# Patient Record
Sex: FEMALE | URBAN - METROPOLITAN AREA
[De-identification: names, ages, dates, MRNs, and addresses within clinical notes are randomized per-mention and may not be internally consistent; named-entity substitution may affect disease eponyms.]

---

## 2021-06-02 ENCOUNTER — APPOINTMENT (RX ONLY)
Dept: URBAN - METROPOLITAN AREA CLINIC 373 | Facility: CLINIC | Age: 86
Setting detail: DERMATOLOGY
End: 2021-06-02

## 2021-06-02 PROBLEM — C44.212 BASAL CELL CARCINOMA OF SKIN OF RIGHT EAR AND EXTERNAL AURICULAR CANAL: Status: ACTIVE | Noted: 2021-06-02

## 2021-06-02 PROBLEM — C44.219 BASAL CELL CARCINOMA OF SKIN OF LEFT EAR AND EXTERNAL AURICULAR CANAL: Status: ACTIVE | Noted: 2021-06-02

## 2021-06-02 PROBLEM — C44.319 BASAL CELL CARCINOMA OF SKIN OF OTHER PARTS OF FACE: Status: ACTIVE | Noted: 2021-06-02

## 2021-06-02 PROCEDURE — 99213 OFFICE O/P EST LOW 20 MIN: CPT

## 2021-06-02 PROCEDURE — ? COUNSELING

## 2021-06-02 PROCEDURE — ? ADDITIONAL NOTES

## 2021-06-02 NOTE — PROCEDURE: ADDITIONAL NOTES
Render Risk Assessment In Note?: no
Additional Notes: Stephania Ely is a 89 y.o. 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 She presents to the office today, with a health aid from her nursing home. The health aid conveys that Stephania has a new power or , who wanted her skin evaluated.  Stephania is upset and agitated at her appointment and refuses to have her care aid in the examination room with her.  \\n\\nMdeborah has dementia and is unable to consent for herself for procedures. At her office visit on 7-3-2019 a basal cell was biopsied from the left of midline forehead. Patient was fearful during the biopsy process and confused about where she was and what we were doing.  \\n \\nI spoke with Stephania and her care aid about the need for her power of  to be present at her office visits and futures Mohs appointments as Stephania is unable to consent for herself.    \\n \\nI explained that in order for us to continue to treat Stephania we need her official POA to come to her office visits and consent to the procedures.
Detail Level: Simple

## 2021-06-02 NOTE — HPI: EVALUATION OF SKIN LESION(S)
What Type Of Note Output Would You Prefer (Optional)?: Bullet Format
Hpi Title: Evaluation of Skin Lesions
How Severe Are Your Spot(S)?: mild
Have Your Spot(S) Been Treated In The Past?: has not been treated
Additional History: Spots on face

## 2021-06-02 NOTE — PROCEDURE: COUNSELING
Patient Specific Counseling (Will Not Stick From Patient To Patient): Stephania adamantly refuses biopsies of any of the presumed basal cell skin cancers.  We discussed that these will continue to grow, can ulcerate, become painful, get infected, and increase morbidity.  Patient expressed understanding, and does not want them biopsied.
Detail Level: Detailed

## 2021-08-30 ENCOUNTER — APPOINTMENT (RX ONLY)
Dept: URBAN - METROPOLITAN AREA CLINIC 373 | Facility: CLINIC | Age: 86
Setting detail: DERMATOLOGY
End: 2021-08-30

## 2021-08-30 PROBLEM — D48.5 NEOPLASM OF UNCERTAIN BEHAVIOR OF SKIN: Status: ACTIVE | Noted: 2021-08-30

## 2021-08-30 PROCEDURE — ? BIOPSY BY SHAVE METHOD

## 2021-08-30 PROCEDURE — 11102 TANGNTL BX SKIN SINGLE LES: CPT

## 2021-08-30 PROCEDURE — ? COUNSELING

## 2021-08-30 PROCEDURE — ? ADDITIONAL NOTES

## 2021-08-30 PROCEDURE — 99213 OFFICE O/P EST LOW 20 MIN: CPT | Mod: 25

## 2021-08-30 NOTE — HPI: EVALUATION OF SKIN LESION(S)
What Type Of Note Output Would You Prefer (Optional)?: Bullet Format
Hpi Title: Evaluation of a Skin Lesion
Additional History: Patient is here for a spot on her R arm, she also has untreated BCC infiltrativ.

## 2021-08-30 NOTE — PROCEDURE: BIOPSY BY SHAVE METHOD
Detail Level: Detailed
Depth Of Biopsy: dermis
Was A Bandage Applied: Yes
Size Of Lesion In Cm: 1.5
X Size Of Lesion In Cm: 2
Biopsy Type: H and E
Biopsy Method: Personna blade
Anesthesia Type: 1% lidocaine with epinephrine
Anesthesia Volume In Cc (Will Not Render If 0): 0
Hemostasis: Aluminum Chloride
Wound Care: Vaseline
Dressing: Band-Aid
Destruction After The Procedure: No
Type Of Destruction Used: Curettage
Curettage Text: The wound bed was treated with curettage after the biopsy was performed.
Cryotherapy Text: The wound bed was treated with cryotherapy after the biopsy was performed.
Electrodesiccation Text: The wound bed was treated with electrodesiccation after the biopsy was performed.
Electrodesiccation And Curettage Text: The wound bed was treated with electrodesiccation and curettage after the biopsy was performed.
Silver Nitrate Text: The wound bed was treated with silver nitrate after the biopsy was performed.
Lab: 6
Lab Facility: [id number]
Consent: Verbal consent was obtained and risks were reviewed including but not limited to scarring, infection, bleeding, scabbing, incomplete removal, nerve damage and allergy to anesthesia.
Post-Care Instructions: Patient was provided both verbal and written detailed post-care instructions.  Patient is to keep the dressing on overnight and remove the dressing with showering.  To apply vaseline and a band-aid each day.
Notification Instructions: Patient will be notified of biopsy results. However, patient instructed to call the office if not contacted within 2 weeks.
Billing Type: Third-Party Bill
Information: Selecting Yes will display possible errors in your note based on the variables you have selected. This validation is only offered as a suggestion for you. PLEASE NOTE THAT THE VALIDATION TEXT WILL BE REMOVED WHEN YOU FINALIZE YOUR NOTE. IF YOU WANT TO FAX A PRELIMINARY NOTE YOU WILL NEED TO TOGGLE THIS TO 'NO' IF YOU DO NOT WANT IT IN YOUR FAXED NOTE.

## 2021-08-30 NOTE — PROCEDURE: ADDITIONAL NOTES
Additional Notes: Lesion on L midline forehead, previously biopsied on 7/2019 and noted to be infiltrative BCC.  Mohs was recommended at that time which the patient never had.  There is unclear documentation as to why but given a certified letter was documented to be returned, question if we were unable to get in contact with the patient.  There is also note of BCC to the R tragus but I am unable to locate the pathology report for these lesions.\\n\\nPatient has several lesions on her head which appear consistent with BCC.  Please see photos in attachments.  Lesions are as measures:\\nL temple 9X8mm\\nR temple 9X6mm\\nForehead superior 7X4mm\\nForehead inferior 6X4mm \\nR tragus 8X7mm\\nR posterior lateral upper arm [id number] (more suspicious for SCCIS or AK)\\n\\nHad long conversation with patient, Judy (POA/Daughter), and Yoselin (granddaughter) regarding different lesions noted today.  The lesion that is most symptomatic (very tender to palpation and bleeding) for the patient was biopsied per above.  Discussed I am concerned that she has a known untreated BCC to her L midline forehead (previously biopsied in 7/2019) and several other likely BCC.  Discussed if these are BCC, the ideal treatment would be Mohs.  Also discussed suboptimal treatments including ED&C.  Discussed the risk of performing suboptimal treatments is if the cancer isn't completely healed, there is a risk of causing a non-healing wound which can often be more bothersome than the original lesion.  Discussed risk of untreated cancers including risk of metastasis (although low with BCC) and invasion into vital organs causing issues.  \\n\\nThe lesions on the patient's face and currently not ulcerated, do not seem to bother the patient, and are nontender to palpation.  In light of being far from vital organs (eyes, mouth, nose, etc), discussed it would be reasonable to continue to monitor without biopsy with checks every few months and discuss treatment should they become symptomatic.  POA acknowledged possible risk of not treating these lesions.  She was in agreement for continuing to monitor the patient's facial lesions and pursue treatment on the lesion on the patient's arm given it is largely symptomatic. Additional Notes: Lesion on L midline forehead, previously biopsied on 7/2019 and noted to be infiltrative BCC.  Mohs was recommended at that time which the patient never had.  There is unclear documentation as to why but given a certified letter was documented to be returned, question if we were unable to get in contact with the patient.  There is also note of BCC to the R tragus but I am unable to locate the pathology report for these lesions.\\n\\nPatient has several lesions on her head which appear consistent with BCC.  Please see photos in attachments.  Lesions are as measures:\\nL temple 9X8mm\\nR temple 9X6mm\\nForehead superior 7X4mm\\nForehead inferior 6X4mm \\nR tragus 8X7mm\\nR posterior lateral upper arm [id number] (more suspicious for SCCIS or AK)\\n\\nHad long conversation with patient, Judy (POA/Daughter), and Yoselin (granddaughter) regarding different lesions noted today.  The lesion that is most symptomatic (very tender to palpation and bleeding) for the patient was biopsied per above.  Discussed I am concerned that she has a known untreated BCC to her L midline forehead (previously biopsied in 7/2019) and several other likely BCC.  Discussed if these are BCC, the ideal treatment would be Mohs.  Also discussed suboptimal treatments including ED&C.  Discussed the risk of performing suboptimal treatments is if the cancer isn't completely healed, there is a risk of causing a non-healing wound which can often be more bothersome than the original lesion.  Discussed risk of untreated cancers including risk of metastasis (although low with BCC) and invasion into vital organs causing issues.  \\n\\nThe lesions on the patient's face and currently not ulcerated, do not seem to bother the patient, and are nontender to palpation.  In light of being far from vital organs (eyes, mouth, nose, etc), discussed it would be reasonable to continue to monitor without biopsy with checks every few months and discuss treatment should they become symptomatic.  POA acknowledged possible risk of not treating these lesions.  She was in agreement for continuing to monitor the patient's facial lesions and pursue treatment on the lesion on the patient's arm given it is largely symptomatic.

## 2021-08-30 NOTE — PROCEDURE: ADDITIONAL NOTES
Additional Notes: SUDEEP/Judy at bedside today.  She states given the lesion is largely symptomatic, they would like to pursue treatment.  Given appearance of the lesion today, concerned that ED&C would not clear carcinoma.  Will refer to [facility] for excision ASAP.  Due to how symptomatic the lesion is, the patient was scheduled for excision on 9/15/21 at 11:00, pending biopsy results.  If pathology is something different than expected (ie amelanotic melanoma vs merkel cell), will change plan of care.

## 2022-01-17 ENCOUNTER — APPOINTMENT (RX ONLY)
Dept: URBAN - METROPOLITAN AREA CLINIC 308 | Facility: CLINIC | Age: 87
Setting detail: DERMATOLOGY
End: 2022-01-17

## 2022-01-17 DIAGNOSIS — S31000A OPEN WOUND(S) (MULTIPLE) OF UNSPECIFIED SITE(S), WITHOUT MENTION OF COMPLICATION: ICD-10-CM

## 2022-01-17 PROBLEM — S41.101A UNSPECIFIED OPEN WOUND OF RIGHT UPPER ARM, INITIAL ENCOUNTER: Status: ACTIVE | Noted: 2022-01-17

## 2022-01-17 PROBLEM — C44.319 BASAL CELL CARCINOMA OF SKIN OF OTHER PARTS OF FACE: Status: ACTIVE | Noted: 2022-01-17

## 2022-01-17 PROBLEM — C44.622 SQUAMOUS CELL CARCINOMA OF SKIN OF RIGHT UPPER LIMB, INCLUDING SHOULDER: Status: ACTIVE | Noted: 2022-01-17

## 2022-01-17 PROBLEM — Z01.818 ENCOUNTER FOR OTHER PREPROCEDURAL EXAMINATION: Status: ACTIVE | Noted: 2022-01-17

## 2022-01-17 PROCEDURE — ? REPAIR NOTE

## 2022-01-17 PROCEDURE — 99202 OFFICE O/P NEW SF 15 MIN: CPT | Mod: 57

## 2022-01-17 PROCEDURE — 17313 MOHS 1 STAGE T/A/L: CPT

## 2022-01-17 PROCEDURE — ? SURGICAL DECISION MAKING

## 2022-01-17 PROCEDURE — ? CONSULTATION FOR MOHS SURGERY

## 2022-01-17 PROCEDURE — 13121 CMPLX RPR S/A/L 2.6-7.5 CM: CPT

## 2022-01-17 PROCEDURE — ? MOHS SURGERY

## 2022-01-17 PROCEDURE — 99212 OFFICE O/P EST SF 10 MIN: CPT | Mod: 25

## 2022-01-17 ASSESSMENT — LOCATION ZONE DERM: LOCATION ZONE: ARM

## 2022-01-17 ASSESSMENT — LOCATION SIMPLE DESCRIPTION DERM: LOCATION SIMPLE: RIGHT UPPER ARM

## 2022-01-17 ASSESSMENT — LOCATION DETAILED DESCRIPTION DERM: LOCATION DETAILED: RIGHT LATERAL PROXIMAL UPPER ARM

## 2022-01-17 NOTE — PROCEDURE: REPAIR NOTE
How Severe Is Your Skin Discoloration?: mild Additional History: Currently using clotrimazole and betamethasone diproprionate. Referred To Plastics For Closure Text (Leave Blank If You Do Not Want): After obtaining clear surgical margins the patient was sent to plastics for surgical repair.  The patient understands they will receive post-surgical care and follow-up from the referring physician's office.

## 2022-01-17 NOTE — PROCEDURE: MOHS SURGERY

## 2022-01-17 NOTE — PROCEDURE: CONSULTATION FOR MOHS SURGERY
Detail Level: Detailed
X Size Of Lesion In Cm (Optional): 0
Other Plan: Medical POA Daughter Judy declines treatment for left glabella and right ear tragus BCCs at this time.
Incorporate Mauc In Note: Yes

## 2022-01-17 NOTE — PROCEDURE: SURGICAL DECISION MAKING
Identified Risk Factors Documented?: yes
Complexity (Necessary For Coding; Major - 90 Day Global With Some Exceptions; Minor - 10 Day Global): major
Date Of Surgery - Today Or Tomorrow?: today
Risk Assessment Explanation (Does Not Render In The Note): Clinical determination of the probability and/or consequences of an event, such as surgery. Clinical assessment of the level of risk is affected by the nature of the event under consideration for the patient. Modifier 57 is used to indicate an Evaluation and Management (E/M) service resulted in the initial decision to perform surgery either the day before a major surgery (90 day global) or the day of a major surgery.
Discussion: Risks, benefits and alternative treatment options discussed with patient as outlined below:\\n\\n

## 2022-08-24 ENCOUNTER — APPOINTMENT (RX ONLY)
Dept: URBAN - METROPOLITAN AREA CLINIC 373 | Facility: CLINIC | Age: 87
Setting detail: DERMATOLOGY
End: 2022-08-24

## 2022-08-24 DIAGNOSIS — L81.4 OTHER MELANIN HYPERPIGMENTATION: ICD-10-CM

## 2022-08-24 DIAGNOSIS — L30.4 ERYTHEMA INTERTRIGO: ICD-10-CM

## 2022-08-24 DIAGNOSIS — D18.0 HEMANGIOMA: ICD-10-CM

## 2022-08-24 DIAGNOSIS — Z85.828 PERSONAL HISTORY OF OTHER MALIGNANT NEOPLASM OF SKIN: ICD-10-CM

## 2022-08-24 DIAGNOSIS — D22 MELANOCYTIC NEVI: ICD-10-CM

## 2022-08-24 DIAGNOSIS — L82.1 OTHER SEBORRHEIC KERATOSIS: ICD-10-CM

## 2022-08-24 PROBLEM — C44.319 BASAL CELL CARCINOMA OF SKIN OF OTHER PARTS OF FACE: Status: ACTIVE | Noted: 2022-08-24

## 2022-08-24 PROBLEM — C44.212 BASAL CELL CARCINOMA OF SKIN OF RIGHT EAR AND EXTERNAL AURICULAR CANAL: Status: ACTIVE | Noted: 2022-08-24

## 2022-08-24 PROBLEM — D04.5 CARCINOMA IN SITU OF SKIN OF TRUNK: Status: ACTIVE | Noted: 2022-08-24

## 2022-08-24 PROBLEM — C44.612 BASAL CELL CARCINOMA OF SKIN OF RIGHT UPPER LIMB, INCLUDING SHOULDER: Status: ACTIVE | Noted: 2022-08-24

## 2022-08-24 PROBLEM — D22.5 MELANOCYTIC NEVI OF TRUNK: Status: ACTIVE | Noted: 2022-08-24

## 2022-08-24 PROBLEM — D18.01 HEMANGIOMA OF SKIN AND SUBCUTANEOUS TISSUE: Status: ACTIVE | Noted: 2022-08-24

## 2022-08-24 PROCEDURE — ? FULL BODY SKIN EXAM

## 2022-08-24 PROCEDURE — ? COUNSELING

## 2022-08-24 PROCEDURE — ? TREATMENT REGIMEN

## 2022-08-24 PROCEDURE — ? ADDITIONAL NOTES

## 2022-08-24 PROCEDURE — ? DEFER

## 2022-08-24 PROCEDURE — 99213 OFFICE O/P EST LOW 20 MIN: CPT

## 2022-08-24 ASSESSMENT — LOCATION DETAILED DESCRIPTION DERM
LOCATION DETAILED: RIGHT CENTRAL MALAR CHEEK
LOCATION DETAILED: PERIUMBILICAL SKIN
LOCATION DETAILED: LEFT INFRAMAMMARY CREASE (INNER QUADRANT)
LOCATION DETAILED: INFERIOR THORACIC SPINE
LOCATION DETAILED: RIGHT MID-UPPER BACK

## 2022-08-24 ASSESSMENT — LOCATION SIMPLE DESCRIPTION DERM
LOCATION SIMPLE: UPPER BACK
LOCATION SIMPLE: RIGHT CHEEK
LOCATION SIMPLE: ABDOMEN
LOCATION SIMPLE: RIGHT UPPER BACK
LOCATION SIMPLE: LEFT BREAST

## 2022-08-24 ASSESSMENT — LOCATION ZONE DERM
LOCATION ZONE: TRUNK
LOCATION ZONE: FACE

## 2022-08-24 NOTE — PROCEDURE: DEFER
Introduction Text (Please End With A Colon): The following procedure was deferred:       Biopsy and Excision :
Size Of Lesion In Cm (Optional): 0
Detail Level: Detailed

## 2022-08-24 NOTE — PROCEDURE: ADDITIONAL NOTES
Additional Notes: Forehead : 28x15\\nR tragus : 12\\nL medial forehead : 15x12\\nR superior temple : 15x11
Render Risk Assessment In Note?: no
Detail Level: Simple
Additional Notes: Patient consent was obtained to proceed with the visit and recommended plan of care after discussion of all risks and benefits, including the risks of COVID-19 exposure.

## 2022-08-24 NOTE — HPI: EVALUATION OF SKIN LESION(S)
What Type Of Note Output Would You Prefer (Optional)?: Bullet Format
Hpi Title: Evaluation of Skin Lesions
How Severe Are Your Spot(S)?: mild
Additional History: Areas cracking and bleeding

## 2023-04-24 NOTE — PROCEDURE: MOHS SURGERY
Hydroxychloroquine Counseling:  I discussed with the patient that a baseline ophthalmologic exam is needed at the start of therapy and every year thereafter while on therapy. A CBC may also be warranted for monitoring.  The side effects of this medication were discussed with the patient, including but not limited to agranulocytosis, aplastic anemia, seizures, rashes, retinopathy, and liver toxicity. Patient instructed to call the office should any adverse effect occur.  The patient verbalized understanding of the proper use and possible adverse effects of Plaquenil.  All the patient's questions and concerns were addressed. Skin Substitute Text: The defect edges were debeveled with a #15 scalpel blade.  Given the location of the defect, shape of the defect and the proximity to free margins a skin substitute graft was deemed most appropriate.  The graft material was trimmed to fit the size of the defect. The graft was then placed in the primary defect and oriented appropriately.